# Patient Record
(demographics unavailable — no encounter records)

---

## 2018-03-19 NOTE — RAD
TWO VIEWS RIGHT TIBIA AND FIBULA:

 

DATE: 3/19/18.

 

HISTORY: 

Additional images requested for localization of foreign body.

 

COMPARISON: 

3/19/18 at 0732 hours.

 

FINDINGS: 

The previously noted 7 mm rounded radiopaque foreign body overlying the mid portion of the distal rig
ht lower extremity is again seen.  This radiopaque foreign body, as mentioned on the prior study, is 
approximately 2.2 cm from the skin surface as measured on the prior true lateral projection.  This ra
diopaque foreign body is not within the osseous structures and is located just anterior to the medial
 cortex of the middle 1/3 of the right fibula.  No fracture is seen.

 

IMPRESSION: 

Radiopaque foreign body distal right lower extremity.

 

POS: [facility]
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
